# Patient Record
Sex: MALE | Race: BLACK OR AFRICAN AMERICAN | NOT HISPANIC OR LATINO | Employment: STUDENT | URBAN - METROPOLITAN AREA
[De-identification: names, ages, dates, MRNs, and addresses within clinical notes are randomized per-mention and may not be internally consistent; named-entity substitution may affect disease eponyms.]

---
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
## 2023-08-30 ENCOUNTER — OFFICE VISIT (OUTPATIENT)
Dept: URGENT CARE | Facility: CLINIC | Age: 18
End: 2023-08-30
Payer: MEDICAID

## 2023-08-30 VITALS
OXYGEN SATURATION: 97 % | SYSTOLIC BLOOD PRESSURE: 121 MMHG | BODY MASS INDEX: 19.73 KG/M2 | RESPIRATION RATE: 18 BRPM | HEIGHT: 69 IN | HEART RATE: 67 BPM | DIASTOLIC BLOOD PRESSURE: 72 MMHG | TEMPERATURE: 98 F | WEIGHT: 133.19 LBS

## 2023-08-30 DIAGNOSIS — Z20.2 POSSIBLE EXPOSURE TO STD: ICD-10-CM

## 2023-08-30 DIAGNOSIS — R30.0 DYSURIA: Primary | ICD-10-CM

## 2023-08-30 DIAGNOSIS — N34.2 URETHRITIS: ICD-10-CM

## 2023-08-30 PROCEDURE — 36415 COLL VENOUS BLD VENIPUNCTURE: CPT | Performed by: INTERNAL MEDICINE

## 2023-08-30 PROCEDURE — 96372 THER/PROPH/DIAG INJ SC/IM: CPT | Mod: S$GLB,,, | Performed by: INTERNAL MEDICINE

## 2023-08-30 PROCEDURE — 87491 CHLMYD TRACH DNA AMP PROBE: CPT | Performed by: INTERNAL MEDICINE

## 2023-08-30 PROCEDURE — 86803 HEPATITIS C AB TEST: CPT | Performed by: INTERNAL MEDICINE

## 2023-08-30 PROCEDURE — 96372 PR INJECTION,THERAP/PROPH/DIAG2ST, IM OR SUBCUT: ICD-10-PCS | Mod: S$GLB,,, | Performed by: INTERNAL MEDICINE

## 2023-08-30 PROCEDURE — 99214 PR OFFICE/OUTPT VISIT, EST, LEVL IV, 30-39 MIN: ICD-10-PCS | Mod: 25,S$GLB,, | Performed by: INTERNAL MEDICINE

## 2023-08-30 PROCEDURE — 87389 HIV-1 AG W/HIV-1&-2 AB AG IA: CPT | Performed by: INTERNAL MEDICINE

## 2023-08-30 PROCEDURE — 99214 OFFICE O/P EST MOD 30 MIN: CPT | Mod: 25,S$GLB,, | Performed by: INTERNAL MEDICINE

## 2023-08-30 PROCEDURE — 87661 TRICHOMONAS VAGINALIS AMPLIF: CPT | Performed by: INTERNAL MEDICINE

## 2023-08-30 PROCEDURE — 86592 SYPHILIS TEST NON-TREP QUAL: CPT | Performed by: INTERNAL MEDICINE

## 2023-08-30 RX ORDER — CETIRIZINE HYDROCHLORIDE 10 MG/1
10 TABLET ORAL DAILY PRN
COMMUNITY

## 2023-08-30 RX ORDER — DOXYCYCLINE 100 MG/1
100 CAPSULE ORAL 2 TIMES DAILY
COMMUNITY
Start: 2023-07-22 | End: 2023-08-30

## 2023-08-30 RX ORDER — DOXYCYCLINE HYCLATE 100 MG
100 TABLET ORAL EVERY 12 HOURS
Qty: 14 TABLET | Refills: 0 | Status: SHIPPED | OUTPATIENT
Start: 2023-08-30 | End: 2023-09-06

## 2023-08-30 RX ORDER — FLUTICASONE PROPIONATE 50 MCG
1 SPRAY, SUSPENSION (ML) NASAL
COMMUNITY

## 2023-08-30 RX ORDER — EPINEPHRINE 0.3 MG/.3ML
1 INJECTION SUBCUTANEOUS
COMMUNITY

## 2023-08-30 RX ORDER — CEFTRIAXONE 500 MG/1
500 INJECTION, POWDER, FOR SOLUTION INTRAMUSCULAR; INTRAVENOUS
Status: COMPLETED | OUTPATIENT
Start: 2023-08-30 | End: 2023-08-30

## 2023-08-30 RX ADMIN — CEFTRIAXONE 500 MG: 500 INJECTION, POWDER, FOR SOLUTION INTRAMUSCULAR; INTRAVENOUS at 02:08

## 2023-08-30 NOTE — PROGRESS NOTES
"Subjective:      Patient ID: Chuy Thomson is a 18 y.o. male.    Vitals:  height is 5' 9" (1.753 m) and weight is 60.4 kg (133 lb 2.5 oz). His temperature is 97.8 °F (36.6 °C). His blood pressure is 121/72 and his pulse is 67. His respiration is 18 and oxygen saturation is 97%.     Chief Complaint: Exposure to STD    Patient presents burning urination that started last week. Patient started taking antibiotics he had for a recent poss STD exposure.    Exposure to STD  The patient's pertinent negatives include no pelvic pain or penile discharge. This is a new problem. The current episode started in the past 7 days. The problem occurs constantly. The problem has been unchanged. The patient is experiencing no pain. He is sexually active. He never uses condoms. It is unknown whether or not his partner has an STD. His past medical history is significant for chlamydia.       Genitourinary:  Negative for penile discharge and pelvic pain.   Skin:  Negative for erythema.      Objective:     Physical Exam   Constitutional: He is oriented to person, place, and time.  Non-toxic appearance. He does not appear ill. No distress. normal  HENT:   Head: Normocephalic and atraumatic.   Ears:   Right Ear: External ear normal.   Left Ear: External ear normal.   Nose: Nose normal.   Mouth/Throat: Mucous membranes are moist.   Eyes: Conjunctivae are normal. Pupils are equal, round, and reactive to light. Right eye exhibits no discharge. Left eye exhibits no discharge. Extraocular movement intact   Pulmonary/Chest: Effort normal. No respiratory distress.   Abdominal: Normal appearance.   Neurological: He is alert and oriented to person, place, and time.   Skin: Skin is not diaphoretic and not pale. No bruising and No erythema jaundice  Psychiatric: His behavior is normal. Mood, judgment and thought content normal.       Assessment:     1. Dysuria    2. Urethritis    3. Possible exposure to STD        Plan:       Dysuria    Urethritis  -     " Cancel: HIV 1/2 Ag/Ab (4th Gen); Future; Expected date: 08/30/2023  -     Cancel: RPR; Future; Expected date: 08/30/2023  -     Cancel: HEPATITIS C ANTIBODY; Future; Expected date: 08/30/2023  -     C. trachomatis/N. gonorrhoeae by AMP DNA [facility]; Urine  -     cefTRIAXone injection 500 mg  -     doxycycline (VIBRA-TABS) 100 MG tablet; Take 1 tablet (100 mg total) by mouth every 12 (twelve) hours. for 7 days  Dispense: 14 tablet; Refill: 0  -     HEPATITIS C ANTIBODY  -     HIV 1/2 Ag/Ab (4th Gen)  -     RPR    Possible exposure to STD  -     Cancel: HIV 1/2 Ag/Ab (4th Gen); Future; Expected date: 08/30/2023  -     Cancel: RPR; Future; Expected date: 08/30/2023  -     Cancel: HEPATITIS C ANTIBODY; Future; Expected date: 08/30/2023  -     C. trachomatis/N. gonorrhoeae by AMP DNA [facility]; Urine  -     Trichomonas vaginalis, RNA, Qual, Urine  -     HEPATITIS C ANTIBODY  -     HIV 1/2 Ag/Ab (4th Gen)  -     RPR

## 2023-08-30 NOTE — PATIENT INSTRUCTIONS
Abstain from sexual activity for the next two weeks. Return to clinic with any issues. Start doxycycline 100 mg twice daily for 7 days. We will call with lab results.

## 2023-08-31 LAB
HCV AB SERPL QL IA: NORMAL
HIV 1+2 AB+HIV1 P24 AG SERPL QL IA: NORMAL

## 2023-09-01 LAB
C TRACH DNA SPEC QL NAA+PROBE: NOT DETECTED
N GONORRHOEA DNA SPEC QL NAA+PROBE: NOT DETECTED
RPR SER QL: NORMAL

## 2023-09-03 LAB
SPECIMEN SOURCE: NORMAL
T VAGINALIS RRNA SPEC QL NAA+PROBE: NEGATIVE

## 2023-09-06 ENCOUNTER — TELEPHONE (OUTPATIENT)
Dept: URGENT CARE | Facility: CLINIC | Age: 18
End: 2023-09-06
Payer: MEDICAID

## 2023-12-13 ENCOUNTER — OFFICE VISIT (OUTPATIENT)
Dept: URGENT CARE | Facility: CLINIC | Age: 18
End: 2023-12-13
Payer: MEDICAID

## 2023-12-13 VITALS
DIASTOLIC BLOOD PRESSURE: 69 MMHG | OXYGEN SATURATION: 99 % | HEIGHT: 69 IN | RESPIRATION RATE: 19 BRPM | SYSTOLIC BLOOD PRESSURE: 118 MMHG | WEIGHT: 134.69 LBS | BODY MASS INDEX: 19.95 KG/M2 | TEMPERATURE: 98 F | HEART RATE: 67 BPM

## 2023-12-13 DIAGNOSIS — J10.1 INFLUENZA A: ICD-10-CM

## 2023-12-13 DIAGNOSIS — R05.9 COUGH, UNSPECIFIED TYPE: Primary | ICD-10-CM

## 2023-12-13 LAB
CTP QC/QA: YES
CTP QC/QA: YES
POC MOLECULAR INFLUENZA A AGN: POSITIVE
POC MOLECULAR INFLUENZA B AGN: NEGATIVE
SARS-COV-2 AG RESP QL IA.RAPID: NEGATIVE

## 2023-12-13 PROCEDURE — 87811 SARS-COV-2 COVID19 W/OPTIC: CPT | Mod: QW,S$GLB,, | Performed by: INTERNAL MEDICINE

## 2023-12-13 PROCEDURE — 99214 PR OFFICE/OUTPT VISIT, EST, LEVL IV, 30-39 MIN: ICD-10-PCS | Mod: S$GLB,,, | Performed by: INTERNAL MEDICINE

## 2023-12-13 PROCEDURE — 99214 OFFICE O/P EST MOD 30 MIN: CPT | Mod: S$GLB,,, | Performed by: INTERNAL MEDICINE

## 2023-12-13 PROCEDURE — 87811 SARS CORONAVIRUS 2 ANTIGEN POCT, MANUAL READ: ICD-10-PCS | Mod: QW,S$GLB,, | Performed by: INTERNAL MEDICINE

## 2023-12-13 PROCEDURE — 87502 POCT INFLUENZA A/B MOLECULAR: ICD-10-PCS | Mod: QW,S$GLB,, | Performed by: INTERNAL MEDICINE

## 2023-12-13 PROCEDURE — 87502 INFLUENZA DNA AMP PROBE: CPT | Mod: QW,S$GLB,, | Performed by: INTERNAL MEDICINE

## 2023-12-13 RX ORDER — ALBUTEROL SULFATE 90 UG/1
2 AEROSOL, METERED RESPIRATORY (INHALATION) EVERY 6 HOURS PRN
Qty: 18 G | Refills: 0 | Status: SHIPPED | OUTPATIENT
Start: 2023-12-13 | End: 2024-12-12

## 2023-12-13 RX ORDER — BENZONATATE 100 MG/1
100 CAPSULE ORAL 3 TIMES DAILY PRN
Qty: 30 CAPSULE | Refills: 0 | Status: SHIPPED | OUTPATIENT
Start: 2023-12-13 | End: 2023-12-23

## 2023-12-13 NOTE — PROGRESS NOTES
"Subjective:      Patient ID: Chuy Thomson is a 18 y.o. male.    Vitals:  height is 5' 9" (1.753 m) and weight is 61.1 kg (134 lb 11.2 oz). His oral temperature is 97.8 °F (36.6 °C). His blood pressure is 118/69 and his pulse is 67. His respiration is 19 and oxygen saturation is 99%.     Chief Complaint: Cough    Patient presents a cough, sore throat and runny nose that started Saturday. Pt has been taking severe cold and flu otc medication, its helping a little.    Cough  This is a new problem. The current episode started in the past 7 days. The problem has been unchanged. The problem occurs every few minutes. Associated symptoms include nasal congestion, postnasal drip, rhinorrhea and a sore throat. Pertinent negatives include no fever. He has tried OTC cough suppressant for the symptoms. The treatment provided mild relief.       Constitution: Negative for fever.   HENT:  Positive for postnasal drip and sore throat.    Respiratory:  Positive for cough.    Skin:  Negative for erythema.      Objective:     Physical Exam   Constitutional: He is oriented to person, place, and time. He appears well-developed. No distress.   HENT:   Head: Normocephalic and atraumatic.   Ears:   Right Ear: External ear normal.   Left Ear: External ear normal.   Nose: Nose normal.   Mouth/Throat: Oropharynx is clear and moist. No oropharyngeal exudate.   Eyes: Conjunctivae and EOM are normal. Pupils are equal, round, and reactive to light. Right eye exhibits no discharge. Left eye exhibits no discharge. No scleral icterus.   Neck: Neck supple.   Cardiovascular: Normal rate, regular rhythm and normal heart sounds.   No murmur heard.Exam reveals no gallop and no friction rub.   Pulmonary/Chest: Effort normal. No respiratory distress. He has no wheezes. He has no rales.   Abdominal: There is no abdominal tenderness.   Lymphadenopathy:     He has no cervical adenopathy.   Neurological: He is alert and oriented to person, place, and time. "   Skin: Skin is warm, dry, not diaphoretic and no rash. Capillary refill takes less than 2 seconds. No erythema   Psychiatric: His behavior is normal.   Nursing note and vitals reviewed.      Assessment:     1. Cough, unspecified type    2. Influenza A        Plan:       Cough, unspecified type  -     SARS Coronavirus 2 Antigen, POCT Manual Read  -     POCT Influenza A/B MOLECULAR    Influenza A  -     benzonatate (TESSALON) 100 MG capsule; Take 1 capsule (100 mg total) by mouth 3 (three) times daily as needed for Cough.  Dispense: 30 capsule; Refill: 0  -     albuterol (VENTOLIN HFA) 90 mcg/actuation inhaler; Inhale 2 puffs into the lungs every 6 (six) hours as needed for Wheezing. Rescue  Dispense: 18 g; Refill: 0

## 2024-03-05 ENCOUNTER — OFFICE VISIT (OUTPATIENT)
Dept: URGENT CARE | Facility: CLINIC | Age: 19
End: 2024-03-05
Payer: MEDICAID

## 2024-03-05 VITALS
BODY MASS INDEX: 20.54 KG/M2 | HEART RATE: 64 BPM | HEIGHT: 69 IN | OXYGEN SATURATION: 99 % | SYSTOLIC BLOOD PRESSURE: 121 MMHG | TEMPERATURE: 97 F | RESPIRATION RATE: 19 BRPM | WEIGHT: 138.69 LBS | DIASTOLIC BLOOD PRESSURE: 66 MMHG

## 2024-03-05 DIAGNOSIS — Z72.51 UNPROTECTED SEXUAL INTERCOURSE: ICD-10-CM

## 2024-03-05 DIAGNOSIS — R30.0 DYSURIA: ICD-10-CM

## 2024-03-05 DIAGNOSIS — Z11.3 SCREENING FOR STD (SEXUALLY TRANSMITTED DISEASE): Primary | ICD-10-CM

## 2024-03-05 PROCEDURE — 87661 TRICHOMONAS VAGINALIS AMPLIF: CPT | Performed by: NURSE PRACTITIONER

## 2024-03-05 PROCEDURE — 87491 CHLMYD TRACH DNA AMP PROBE: CPT | Performed by: NURSE PRACTITIONER

## 2024-03-05 PROCEDURE — 99214 OFFICE O/P EST MOD 30 MIN: CPT | Mod: S$GLB,,, | Performed by: NURSE PRACTITIONER

## 2024-03-05 PROCEDURE — 87086 URINE CULTURE/COLONY COUNT: CPT | Performed by: NURSE PRACTITIONER

## 2024-03-05 RX ORDER — DOXYCYCLINE 100 MG/1
100 CAPSULE ORAL 2 TIMES DAILY
Qty: 14 CAPSULE | Refills: 0 | Status: SHIPPED | OUTPATIENT
Start: 2024-03-05 | End: 2024-03-12

## 2024-03-05 RX ORDER — CEFTRIAXONE 500 MG/1
500 INJECTION, POWDER, FOR SOLUTION INTRAMUSCULAR; INTRAVENOUS
Status: COMPLETED | OUTPATIENT
Start: 2024-03-05 | End: 2024-03-05

## 2024-03-05 RX ORDER — WATER FOR INJECTION,STERILE
2.1 VIAL (ML) INJECTION
Status: DISCONTINUED | OUTPATIENT
Start: 2024-03-05 | End: 2024-03-07

## 2024-03-05 RX ADMIN — Medication 1 ML: at 01:03

## 2024-03-05 RX ADMIN — CEFTRIAXONE 500 MG: 500 INJECTION, POWDER, FOR SOLUTION INTRAMUSCULAR; INTRAVENOUS at 01:03

## 2024-03-05 NOTE — PATIENT INSTRUCTIONS
Start Doxycycline today.  Take it with food and avoid direct sunlight.  We will call you with your test results in 3-5 days.  Strict condom use advised. Return to clinic as needed.     STD Screening     You were tested for sexual transmitted diseases today, we will call you in 3-7 days with the results of the testing. If you need more medication when the labs result come in, we will call you and phone them in for you.  PLEASE SET UP YOUR [employer] ACCOUNT SO THAT YOU CAN RECEIVE YOUR LAB RESULTS ONCE THEY RETURN.  Strict condom use advised.  Please remain abstinent until further notice.         IF POSITIVE:    Notify sexual partners of the need for testing.    NO sexual intercourse until given all lab results and appropriate treatment. Avoid sexual intercourse for 7 days until you and your partner have been treated.     Complete ALL medications prescribed (if required).  Please supplement with OTC probiotics and yogurt if prescribed antibiotics. Take probiotics or eat yogurt 4 hours after you take antibiotics.    Re-test to ensure infection has cleared-there are infections that require more agressive treatment.  Retest for all in 6 weeks (if still have symptoms) and again in 3-6 months to ensure true negative results.  You may be instructed to re-test for cure sooner than 6 weeks.     Today's testing will give no crediable information if you have unprotected sexual activities going forward.      Today's testing will give no crediable information if you have unprotected sexual activities going forward.      REMEMBER WEAR CONDOMS AND GET TESTED OFTEN.             Discussed diagnosis with patient as well as treatment and home care. Discussed return to clinic precautions vs ER precautions. All patients questions answered. Patient verbalized understanding. Patient agreed with plan of care.     You must understand that you have received an Urgent Care treatment only and that you may be released before all of your medical  problems are known or treated.

## 2024-03-05 NOTE — PROGRESS NOTES
"Subjective:      Patient ID: Chuy Thomson is a 18 y.o. male.    Vitals:  height is 5' 9" (1.753 m) and weight is 62.9 kg (138 lb 10.7 oz). His oral temperature is 96.9 °F (36.1 °C). His blood pressure is 121/66 and his pulse is 64. His respiration is 19 and oxygen saturation is 99%.     Chief Complaint: Dysuria    Pt presents burning during urination that has been on going for 4 days. Would like urine std testing.    19 yo man, c/o dysuria for the past 4 days. Reports UPI 2 weeks ago with a female partner.  Reports multiple partners in the last 3 months with intermittent condom use.  Last STD testing was in 8/23, all tests are negative. He has a history of CT, which was treated a few years ago. He denies fevers, chills, urinary frequency, urgency, abdominal pain, flank pain or pain or swelling in the scrotum or penis. No n/v/d.      Dysuria   This is a new problem. The current episode started in the past 7 days. The problem occurs every urination. The problem has been unchanged. The quality of the pain is described as burning. The pain is at a severity of 0/10. The patient is experiencing no pain. There has been no fever. He is Sexually active. He has tried nothing for the symptoms. His past medical history is significant for STD.       Genitourinary:  Positive for dysuria.      Objective:     Physical Exam   Constitutional: He is oriented to person, place, and time. He appears well-developed. No distress.   HENT:   Head: Normocephalic and atraumatic.   Ears:   Right Ear: External ear normal.   Left Ear: External ear normal.   Nose: Nose normal. No nasal deformity. No epistaxis.   Mouth/Throat: Oropharynx is clear and moist and mucous membranes are normal.   Eyes: Conjunctivae and lids are normal.   Neck: Trachea normal and phonation normal. Neck supple.   Cardiovascular: Normal rate, regular rhythm and normal heart sounds.   Pulmonary/Chest: Effort normal and breath sounds normal.   Abdominal: Normal appearance and " bowel sounds are normal. Soft. There is no left CVA tenderness and no right CVA tenderness.   Genitourinary:         Comments: Patient declined a  exam today.     Musculoskeletal: Normal range of motion.         General: Normal range of motion.   Neurological: He is alert and oriented to person, place, and time. He has normal reflexes.   Skin: Skin is warm, dry, intact and not diaphoretic.   Psychiatric: His speech is normal and behavior is normal. Judgment and thought content normal.   Nursing note and vitals reviewed.      Assessment:     1. Screening for STD (sexually transmitted disease)    2. Dysuria    3. Unprotected sexual intercourse        Plan:   Follow labs, will contact the patient with test results in 3-5 days.  Strongly advised strict condom use.  Return to clinic as needed.     Screening for STD (sexually transmitted disease)  -     C. trachomatis/N. gonorrhoeae by AMP DNA [facility]; Urine  -     Trichomonas vaginalis, RNA, Qual, Urine    Dysuria  -     C. trachomatis/N. gonorrhoeae by AMP DNA [facility]; Urine  -     Trichomonas vaginalis, RNA, Qual, Urine  -     cefTRIAXone injection 500 mg  -     doxycycline (VIBRAMYCIN) 100 MG Cap; Take 1 capsule (100 mg total) by mouth 2 (two) times daily. for 7 days  Dispense: 14 capsule; Refill: 0  -     CULTURE, URINE    Unprotected sexual intercourse  -     C. trachomatis/N. gonorrhoeae by AMP DNA [facility]; Urine  -     Trichomonas vaginalis, RNA, Qual, Urine  -     cefTRIAXone injection 500 mg  -     doxycycline (VIBRAMYCIN) 100 MG Cap; Take 1 capsule (100 mg total) by mouth 2 (two) times daily. for 7 days  Dispense: 14 capsule; Refill: 0

## 2024-03-06 LAB
BACTERIA UR CULT: NO GROWTH
C TRACH DNA SPEC QL NAA+PROBE: NOT DETECTED
N GONORRHOEA DNA SPEC QL NAA+PROBE: NOT DETECTED

## 2024-03-07 LAB
SPECIMEN SOURCE: NORMAL
T VAGINALIS RRNA SPEC QL NAA+PROBE: NEGATIVE

## 2024-03-07 RX ORDER — WATER FOR INJECTION,STERILE
1 VIAL (ML) INJECTION
Status: COMPLETED | OUTPATIENT
Start: 2024-03-05 | End: 2024-03-05

## 2024-03-07 RX ORDER — WATER FOR INJECTION,STERILE
1 VIAL (ML) INJECTION
Status: DISCONTINUED | OUTPATIENT
Start: 2024-03-05 | End: 2024-03-07

## 2024-03-08 ENCOUNTER — TELEPHONE (OUTPATIENT)
Dept: URGENT CARE | Facility: CLINIC | Age: 19
End: 2024-03-08
Payer: COMMERCIAL

## 2024-03-08 NOTE — TELEPHONE ENCOUNTER
Called patient regarding lab results.  Left message on voice mail.  GC/CT and Trichomonas are negative.  Urine culture showed no growth.  his is the first attempt to reach the patient.

## 2024-03-12 ENCOUNTER — PATIENT MESSAGE (OUTPATIENT)
Dept: URGENT CARE | Facility: CLINIC | Age: 19
End: 2024-03-12
Payer: COMMERCIAL

## 2024-05-09 ENCOUNTER — OFFICE VISIT (OUTPATIENT)
Dept: URGENT CARE | Facility: CLINIC | Age: 19
End: 2024-05-09
Payer: COMMERCIAL

## 2024-05-09 VITALS
RESPIRATION RATE: 19 BRPM | SYSTOLIC BLOOD PRESSURE: 105 MMHG | DIASTOLIC BLOOD PRESSURE: 61 MMHG | HEART RATE: 55 BPM | OXYGEN SATURATION: 99 % | WEIGHT: 138 LBS | TEMPERATURE: 97 F | BODY MASS INDEX: 20.44 KG/M2 | HEIGHT: 69 IN

## 2024-05-09 DIAGNOSIS — Z11.3 SCREENING FOR STD (SEXUALLY TRANSMITTED DISEASE): ICD-10-CM

## 2024-05-09 DIAGNOSIS — R36.9 URETHRAL DISCHARGE: Primary | ICD-10-CM

## 2024-05-09 LAB
HCV AB SERPL QL IA: NORMAL
HIV 1+2 AB+HIV1 P24 AG SERPL QL IA: NORMAL

## 2024-05-09 PROCEDURE — 36415 COLL VENOUS BLD VENIPUNCTURE: CPT | Performed by: NURSE PRACTITIONER

## 2024-05-09 PROCEDURE — 99213 OFFICE O/P EST LOW 20 MIN: CPT | Mod: S$GLB,,, | Performed by: NURSE PRACTITIONER

## 2024-05-09 PROCEDURE — 87389 HIV-1 AG W/HIV-1&-2 AB AG IA: CPT | Performed by: NURSE PRACTITIONER

## 2024-05-09 PROCEDURE — 87661 TRICHOMONAS VAGINALIS AMPLIF: CPT | Performed by: NURSE PRACTITIONER

## 2024-05-09 PROCEDURE — 86593 SYPHILIS TEST NON-TREP QUANT: CPT | Performed by: NURSE PRACTITIONER

## 2024-05-09 PROCEDURE — 87491 CHLMYD TRACH DNA AMP PROBE: CPT | Performed by: NURSE PRACTITIONER

## 2024-05-09 PROCEDURE — 86803 HEPATITIS C AB TEST: CPT | Performed by: NURSE PRACTITIONER

## 2024-05-09 PROCEDURE — 87591 N.GONORRHOEAE DNA AMP PROB: CPT | Performed by: NURSE PRACTITIONER

## 2024-05-09 NOTE — PATIENT INSTRUCTIONS
We will call you with your test results in 3-5 days.  Follow up in urgent care for treatment if you have any positive test results in your portal.  Very strongly encourage condom use.  Return to clinica as needed.     STD Screening     IF YOU ARE TESTING TODAY WITH A CONCERN FOR A POTENTIAL EXPOSURE TO AN STD AFTER UNPROTECTED SEXUAL INTERCOURSE, IT IS RECOMMENDED TO TEST TODAY AND AGAIN IN 4-6 WEEKS AFTER THE POTENTIAL EXPOSURE TO ENSURE TRUE NEGATIVE RESULTS.       REMAIN ABSTINENT UNTIL AFTER YOUR TESTS HAVE RESULTED    We will call you in 3-7 days with the results of the testing. If you need more medication when the labs result come in, we will call you and phone them in for you.  PLEASE SET UP YOUR [employer] ACCOUNT SO THAT YOU CAN RECEIVE YOUR LAB RESULTS ONCE THEY RETURN.        IF POSITIVE:     IF YOU ARE POSITIVE FOR AN STD, IT IS RECOMMENDED THAT YOU TEST AGAIN IN 3-6 MONTHS AFTER COMPLETION OF THERAPY TO ENSURE YOU REMAIN NEGATIVE.  CERTAIN POSITIVE TESTS MAY REQUIRE SOONER RE-TESTING AND WE WILL GUIDE YOU IN THESE SITUATIONS.  Notify sexual partners of the need for testing.    NO sexual intercourse until given all lab results and appropriate treatment. Avoid sexual intercourse for 7 days until you and your partner have been treated.  Complete ALL medications prescribed (if required).  Please supplement with OTC probiotics and yogurt if prescribed antibiotics. Take probiotics or eat yogurt 4 hours after you take antibiotics.     Today's testing will give no crediable information if you have unprotected sexual activities going forward.         REMEMBER WEAR CONDOMS AND GET TESTED OFTEN.

## 2024-05-09 NOTE — PROGRESS NOTES
"Subjective:      Patient ID: Chuy Thomson is a 18 y.o. male.    Vitals:  height is 5' 9" (1.753 m) and weight is 62.6 kg (138 lb). His oral temperature is 97.1 °F (36.2 °C). His blood pressure is 105/61 and his pulse is 55 (abnormal). His respiration is 19 and oxygen saturation is 99%.     Chief Complaint: Penile Discharge    Pt presents clear penile discharge that has been on going for 3 days.    17 yo man, reports for STD testing after having one episode of clear urethral discharge 3 days ago.  He sneezed after he finished urinating and noticed the discharge.  Pt is not sure if this was urine or not. He reports UPI with 3 female partners since he was last tested for STD's in 3/24.  All tests were negative in March. He also had negative STD results in 8/30/23 and 12/28/21.  He denies any fevers, chills, n/v/d, abdominal pain or flank pain.  Denies any urinary urgency, frequency, dysuria, swelling in the scrotum or testicles or pelvic pain.         Penile Discharge  The patient's primary symptoms include penile discharge. This is a new problem. The current episode started in the past 7 days. The problem occurs constantly. The problem has been unchanged. The patient's penis is circumcised.The penile discharge was Clear. He has tried nothing for the symptoms. He is sexually active. He inconsistently uses condoms. No, his partner does not have an STD. His past medical history is significant for chlamydia.       Genitourinary:  Positive for penile discharge.      Objective:     Physical Exam   Constitutional: He is oriented to person, place, and time. He appears well-developed. No distress.   HENT:   Head: Normocephalic and atraumatic.   Ears:   Right Ear: External ear normal.   Left Ear: External ear normal.   Nose: Nose normal. No nasal deformity. No epistaxis.   Mouth/Throat: Oropharynx is clear and moist and mucous membranes are normal.   Eyes: Conjunctivae and lids are normal.   Neck: Trachea normal and phonation " normal. Neck supple.   Cardiovascular: Normal rate, regular rhythm and normal heart sounds.   Pulmonary/Chest: Effort normal and breath sounds normal.   Abdominal: Normal appearance and bowel sounds are normal. He exhibits no distension. Soft. There is no abdominal tenderness. Hernia confirmed negative in the left inguinal area and confirmed negative in the right inguinal area.   Genitourinary:    Testes normal.   no penile rash. Right testis shows no mass, no swelling and no tenderness. Left testis shows no mass, no swelling and no tenderness. Right epididymis is/has Normal. Left epididymis is/has normal. Circumcised. No phimosis, paraphimosis, hypospadias, penile erythema, penile tenderness or penile swelling. Penis exhibits no lesions.         Comments: No urethral discharge noted on exam or any inguinal adenopathy     Musculoskeletal: Normal range of motion.         General: Normal range of motion.   Lymphadenopathy: No inguinal adenopathy noted on the right or left side.   Neurological: He is alert and oriented to person, place, and time. He has normal reflexes.   Skin: Skin is warm, dry, intact and not diaphoretic.   Psychiatric: His speech is normal and behavior is normal. Judgment and thought content normal.   Nursing note and vitals reviewed.      Assessment:     1. Urethral discharge    2. Screening for STD (sexually transmitted disease)        Plan:   Patient is currently asymptomatic with a normal exam.  Will not presumptively treat today.  Follow labs and notify patient.  He knows he can follow up in any urgent care over the weekend for treatment if necessary.  He can view his test results in the protal.  Return to clinic as needed.     Urethral discharge  -     C. trachomatis/N. gonorrhoeae by AMP DNA [facility]; Urine  -     Trichomonas vaginalis, RNA, Qual, Urine    Screening for STD (sexually transmitted disease)  -     C. trachomatis/N. gonorrhoeae by AMP DNA [facility]; Urine  -     Hepatitis C  Antibody  -     HIV 1/2 Ag/Ab (4th Gen)  -     Trichomonas vaginalis, RNA, Qual, Urine  -     Misc Sendout Test, Blood treponema pallidum

## 2024-05-10 LAB — TREPONEMA PALLIDUM IGG+IGM AB [PRESENCE] IN SERUM OR PLASMA BY IMMUNOASSAY: NONREACTIVE

## 2024-05-11 LAB
C TRACH DNA SPEC QL NAA+PROBE: NOT DETECTED
N GONORRHOEA DNA SPEC QL NAA+PROBE: NOT DETECTED
SPECIMEN SOURCE: NORMAL
T VAGINALIS RRNA SPEC QL NAA+PROBE: NEGATIVE

## 2024-10-02 ENCOUNTER — OFFICE VISIT (OUTPATIENT)
Dept: URGENT CARE | Facility: CLINIC | Age: 19
End: 2024-10-02
Payer: COMMERCIAL

## 2024-10-02 VITALS
BODY MASS INDEX: 18.96 KG/M2 | DIASTOLIC BLOOD PRESSURE: 63 MMHG | TEMPERATURE: 99 F | HEART RATE: 62 BPM | HEIGHT: 69 IN | RESPIRATION RATE: 18 BRPM | WEIGHT: 128 LBS | SYSTOLIC BLOOD PRESSURE: 118 MMHG | OXYGEN SATURATION: 98 %

## 2024-10-02 DIAGNOSIS — Z11.3 SCREENING EXAMINATION FOR STD (SEXUALLY TRANSMITTED DISEASE): Primary | ICD-10-CM

## 2024-10-02 PROBLEM — Z87.19 HISTORY OF ESOPHAGEAL REFLUX: Status: ACTIVE | Noted: 2019-12-11

## 2024-10-02 PROBLEM — R13.10 DYSPHAGIA: Status: ACTIVE | Noted: 2019-12-11

## 2024-10-02 PROCEDURE — 87491 CHLMYD TRACH DNA AMP PROBE: CPT | Performed by: INTERNAL MEDICINE

## 2024-10-02 PROCEDURE — 99213 OFFICE O/P EST LOW 20 MIN: CPT | Mod: S$GLB,,, | Performed by: INTERNAL MEDICINE

## 2024-10-02 PROCEDURE — 87591 N.GONORRHOEAE DNA AMP PROB: CPT | Performed by: INTERNAL MEDICINE

## 2024-10-02 NOTE — PROGRESS NOTES
"Subjective:      Patient ID: Chuy Thomson is a 19 y.o. male.    Vitals:  height is 5' 9" (1.753 m) and weight is 58 kg (127 lb 15.6 oz). His oral temperature is 98.8 °F (37.1 °C). His blood pressure is 118/63 and his pulse is 62. His respiration is 18 and oxygen saturation is 98%.     Chief Complaint: Exposure to STD    This is a 19 y.o. male who presents today with a chief complaint of std routine testing. Last intercourse was 3 days ago. Would like urine std testing only.    Exposure to STD  This is a new problem. The current episode started today. He is sexually active. He inconsistently uses condoms. No, his partner does not have an STD. His past medical history is significant for chlamydia.       Skin:  Negative for erythema.      Objective:     Physical Exam   Constitutional: He is oriented to person, place, and time.  Non-toxic appearance. He does not appear ill. No distress. normal  HENT:   Head: Normocephalic and atraumatic.   Ears:   Right Ear: External ear normal.   Left Ear: External ear normal.   Nose: Nose normal.   Mouth/Throat: Mucous membranes are moist.   Eyes: Conjunctivae are normal. Pupils are equal, round, and reactive to light. Right eye exhibits no discharge. Left eye exhibits no discharge. Extraocular movement intact   Pulmonary/Chest: Effort normal. No respiratory distress.   Abdominal: Normal appearance.   Neurological: He is alert and oriented to person, place, and time.   Skin: Skin is not diaphoretic and not pale. No bruising and No erythema jaundice  Psychiatric: His behavior is normal. Mood, judgment and thought content normal.       Assessment:     1. Screening examination for STD (sexually transmitted disease)        Plan:       Screening examination for STD (sexually transmitted disease)  -     C. trachomatis/N. gonorrhoeae by AMP [facility]; Urine                    "

## 2024-10-02 NOTE — PATIENT INSTRUCTIONS
IF YOU ARE TESTING TODAY WITH A CONCERN FOR A POTENTIAL EXPOSURE TO AN STD AFTER UNPROTECTED SEXUAL INTERCOURSE, IT IS RECOMMENDED TO TEST TODAY AND AGAIN IN 4-6 WEEKS AFTER THE POTENTIAL EXPOSURE TO ENSURE TRUE NEGATIVE RESULTS.      REMAIN ABSTINENT UNTIL AFTER YOUR TESTS HAVE RESULTED    We will call you in 3-7 days with the results of the testing. If you need more medication when the labs result come in, we will call you and phone them in for you.  PLEASE SET UP YOUR [employer] ACCOUNT SO THAT YOU CAN RECEIVE YOUR LAB RESULTS ONCE THEY RETURN.        IF POSITIVE for an STD:     IF YOU ARE POSITIVE FOR AN STD, IT IS RECOMMENDED THAT YOU TEST AGAIN IN 3-6 MONTHS AFTER COMPLETION OF THERAPY TO ENSURE YOU REMAIN NEGATIVE.    CERTAIN POSITIVE TESTS MAY REQUIRE SOONER RE-TESTING AND WE WILL GUIDE YOU IN THESE SITUATIONS.    Notify sexual partners of the need for testing.      NO sexual intercourse until given all lab results and appropriate treatment. Avoid sexual intercourse for 7 days until you and your partner have been treated.    Complete ALL medications prescribed (if required).  Please supplement with OTC probiotics and yogurt if prescribed antibiotics. Take probiotics or eat yogurt 4 hours after you take antibiotics.           Today's testing will give no crediable information if you have unprotected sexual activities going forward.         REMEMBER WEAR CONDOMS AND GET TESTED OFTEN.

## 2024-10-05 LAB
C TRACH DNA SPEC QL NAA+PROBE: NOT DETECTED
N GONORRHOEA DNA SPEC QL NAA+PROBE: NOT DETECTED

## 2024-10-07 ENCOUNTER — PATIENT MESSAGE (OUTPATIENT)
Dept: URGENT CARE | Facility: CLINIC | Age: 19
End: 2024-10-07
Payer: COMMERCIAL

## 2024-11-11 ENCOUNTER — OFFICE VISIT (OUTPATIENT)
Dept: URGENT CARE | Facility: CLINIC | Age: 19
End: 2024-11-11
Payer: COMMERCIAL

## 2024-11-11 VITALS
TEMPERATURE: 98 F | HEIGHT: 69 IN | HEART RATE: 55 BPM | WEIGHT: 134.69 LBS | DIASTOLIC BLOOD PRESSURE: 64 MMHG | SYSTOLIC BLOOD PRESSURE: 107 MMHG | OXYGEN SATURATION: 99 % | BODY MASS INDEX: 19.95 KG/M2 | RESPIRATION RATE: 17 BRPM

## 2024-11-11 DIAGNOSIS — R36.9 PENILE DISCHARGE: Primary | ICD-10-CM

## 2024-11-11 DIAGNOSIS — Z11.3 SCREENING EXAMINATION FOR VENEREAL DISEASE: ICD-10-CM

## 2024-11-11 DIAGNOSIS — R30.0 DYSURIA: ICD-10-CM

## 2024-11-11 LAB
HCV AB SERPL QL IA: NORMAL
HIV 1+2 AB+HIV1 P24 AG SERPL QL IA: NORMAL
TREPONEMA PALLIDUM IGG+IGM AB [PRESENCE] IN SERUM OR PLASMA BY IMMUNOASSAY: NONREACTIVE

## 2024-11-11 PROCEDURE — 36415 COLL VENOUS BLD VENIPUNCTURE: CPT | Performed by: NURSE PRACTITIONER

## 2024-11-11 PROCEDURE — 99213 OFFICE O/P EST LOW 20 MIN: CPT | Mod: S$GLB,,, | Performed by: NURSE PRACTITIONER

## 2024-11-11 PROCEDURE — 86593 SYPHILIS TEST NON-TREP QUANT: CPT | Performed by: NURSE PRACTITIONER

## 2024-11-11 PROCEDURE — 87389 HIV-1 AG W/HIV-1&-2 AB AG IA: CPT | Performed by: NURSE PRACTITIONER

## 2024-11-11 PROCEDURE — 86803 HEPATITIS C AB TEST: CPT | Performed by: NURSE PRACTITIONER

## 2024-11-11 PROCEDURE — 87491 CHLMYD TRACH DNA AMP PROBE: CPT | Performed by: NURSE PRACTITIONER

## 2024-11-11 NOTE — PROGRESS NOTES
"Subjective:      Patient ID: Chuy Thomson is a 19 y.o. male.    Vitals:  height is 5' 9" (1.753 m) and weight is 61.1 kg (134 lb 11.2 oz). His oral temperature is 98 °F (36.7 °C). His blood pressure is 107/64 and his pulse is 55 (abnormal). His respiration is 17 and oxygen saturation is 99%.     Chief Complaint: Exposure to STD    20 yo male pt c/o exposure to STD, last week. Pt states he noticed am clear discharge at the beginning of last week.     Exposure to STD  The patient's primary symptoms include genital itching and penile discharge. This is a new problem. The current episode started in the past 7 days. The problem has been unchanged. The patient is experiencing no pain. Pertinent negatives include no rash. Nothing aggravates the symptoms. He has tried nothing for the symptoms. He is sexually active. He inconsistently uses condoms. It is unknown whether or not his partner has an STD.       Genitourinary:  Positive for penile discharge.   Skin:  Negative for rash.    Pt states he has multiple unprotected sexual partners since August 2024.   Objective:     Physical Exam  No physical exam was performed today.   Assessment:     1. Penile discharge    2. Screening examination for venereal disease    3. Dysuria        Plan:       Penile discharge  -     C. trachomatis/N. gonorrhoeae by AMP [facility]; Urine  -     Hepatitis C Antibody  -     HIV 1/2 Ag/Ab (4th Gen)  -     Treponema Pallidium Antibodies IgG, IgM    Screening examination for venereal disease  -     C. trachomatis/N. gonorrhoeae by AMP [facility]; Urine  -     Hepatitis C Antibody  -     HIV 1/2 Ag/Ab (4th Gen)  -     Treponema Pallidium Antibodies IgG, IgM    Dysuria  -     C. trachomatis/N. gonorrhoeae by AMP [facility]; Urine  -     Hepatitis C Antibody  -     HIV 1/2 Ag/Ab (4th Gen)  -     Treponema Pallidium Antibodies IgG, IgM      Wants to wait for results for treatment.              "

## 2024-11-11 NOTE — PATIENT INSTRUCTIONS
STD Screening     IF YOU ARE TESTING TODAY WITH A CONCERN FOR A POTENTIAL EXPOSURE TO AN STD AFTER UNPROTECTED SEXUAL INTERCOURSE, IT IS RECOMMENDED TO TEST TODAY AND AGAIN IN 4-6 WEEKS AFTER THE POTENTIAL EXPOSURE TO ENSURE TRUE NEGATIVE RESULTS.       REMAIN ABSTINENT UNTIL AFTER YOUR TESTS HAVE RESULTED    We will call you in 3-7 days with the results of the testing. If you need more medication when the labs result come in, we will call you and phone them in for you.  PLEASE SET UP YOUR [email or web address] ACCOUNT SO THAT YOU CAN RECEIVE YOUR LAB RESULTS ONCE THEY RETURN.        IF POSITIVE:     IF YOU ARE POSITIVE FOR AN STD, IT IS RECOMMENDED THAT YOU TEST AGAIN IN 3-6 MONTHS AFTER COMPLETION OF THERAPY TO ENSURE YOU REMAIN NEGATIVE.  CERTAIN POSITIVE TESTS MAY REQUIRE SOONER RE-TESTING AND WE WILL GUIDE YOU IN THESE SITUATIONS.  Notify sexual partners of the need for testing.    NO sexual intercourse until given all lab results and appropriate treatment. Avoid sexual intercourse for 7 days until you and your partner have been treated.  Complete ALL medications prescribed (if required).  Please supplement with OTC probiotics and yogurt if prescribed antibiotics. Take probiotics or eat yogurt 4 hours after you take antibiotics.     Today's testing will give no crediable information if you have unprotected sexual activities going forward.         REMEMBER WEAR CONDOMS AND GET TESTED OFTEN.

## 2024-11-12 LAB
C TRACH DNA SPEC QL NAA+PROBE: NOT DETECTED
N GONORRHOEA DNA SPEC QL NAA+PROBE: NOT DETECTED

## 2024-11-13 ENCOUNTER — PATIENT MESSAGE (OUTPATIENT)
Dept: URGENT CARE | Facility: CLINIC | Age: 19
End: 2024-11-13
Payer: COMMERCIAL

## 2024-11-25 ENCOUNTER — OFFICE VISIT (OUTPATIENT)
Dept: URGENT CARE | Facility: CLINIC | Age: 19
End: 2024-11-25
Payer: COMMERCIAL

## 2024-11-25 VITALS
OXYGEN SATURATION: 98 % | DIASTOLIC BLOOD PRESSURE: 68 MMHG | HEART RATE: 67 BPM | HEIGHT: 69 IN | BODY MASS INDEX: 19.67 KG/M2 | SYSTOLIC BLOOD PRESSURE: 113 MMHG | RESPIRATION RATE: 19 BRPM | TEMPERATURE: 97 F | WEIGHT: 132.81 LBS

## 2024-11-25 DIAGNOSIS — R30.0 DYSURIA: ICD-10-CM

## 2024-11-25 DIAGNOSIS — R36.9 PENILE DISCHARGE: Primary | ICD-10-CM

## 2024-11-25 LAB
C TRACH DNA SPEC QL NAA+PROBE: NOT DETECTED
N GONORRHOEA DNA SPEC QL NAA+PROBE: NOT DETECTED

## 2024-11-25 PROCEDURE — 87086 URINE CULTURE/COLONY COUNT: CPT | Performed by: NURSE PRACTITIONER

## 2024-11-25 PROCEDURE — 87563 M. GENITALIUM AMP PROBE: CPT | Performed by: NURSE PRACTITIONER

## 2024-11-25 PROCEDURE — 87491 CHLMYD TRACH DNA AMP PROBE: CPT | Performed by: NURSE PRACTITIONER

## 2024-11-25 PROCEDURE — 87661 TRICHOMONAS VAGINALIS AMPLIF: CPT | Performed by: NURSE PRACTITIONER

## 2024-11-25 PROCEDURE — 87798 DETECT AGENT NOS DNA AMP: CPT | Mod: 59 | Performed by: NURSE PRACTITIONER

## 2024-11-25 PROCEDURE — 99213 OFFICE O/P EST LOW 20 MIN: CPT | Mod: S$GLB,,, | Performed by: NURSE PRACTITIONER

## 2024-11-25 NOTE — PROGRESS NOTES
"Subjective:      Patient ID: Chuy Thomson is a 19 y.o. male.    Vitals:  height is 5' 9" (1.753 m) and weight is 60.2 kg (132 lb 13.2 oz). His oral temperature is 97 °F (36.1 °C). His blood pressure is 113/68 and his pulse is 67.     Chief Complaint: Penile Discharge (X 1 month.  2 weeks ago STI testing was NEG but has had UPI again. )    This is a 19 y.o. male who presents today with a chief complaint of penile discharge that started 1 month ago. Pt states he recently did std testing, results was negative.    Penile Discharge  The patient's primary symptoms include penile discharge. The current episode started 1 to 4 weeks ago. The problem occurs constantly. The problem has been gradually worsening. The pain is mild. Associated symptoms include dysuria. The penile discharge was White. The symptoms are aggravated by urination. He is sexually active. He inconsistently uses condoms. No, his partner does not have an STD.     Genitourinary:  Positive for dysuria and penile discharge.    He states his d/c is cloudy and white and he has painful urination for past month.   Objective:     Physical Exam   Constitutional: He is oriented to person, place, and time.  Non-toxic appearance. He does not appear ill. No distress.   HENT:   Head: Normocephalic and atraumatic.   Eyes: Conjunctivae are normal. Pupils are equal, round, and reactive to light. Extraocular movement intact   Cardiovascular: Normal rate, regular rhythm, normal heart sounds and normal pulses.   Pulmonary/Chest: Effort normal and breath sounds normal. No respiratory distress. He has no wheezes.   Abdominal: Normal appearance.   Genitourinary:         Comments: Refused exam.     Musculoskeletal: Normal range of motion.         General: Normal range of motion.   Neurological: no focal deficit. He is alert and oriented to person, place, and time.   Skin: Skin is warm, dry and not diaphoretic.   Psychiatric: His behavior is normal. Mood normal.   Nursing note and " vitals reviewed.  I tested patient 2 weeks ago for GC/CT and was NEG.    Assessment:     1. Penile discharge    2. Dysuria        Plan:       Penile discharge  -     CULTURE, URINE  -     C. trachomatis/N. gonorrhoeae by AMP [facility]; Urine  -     Trichomonas vaginalis, RNA, Qual, Urine  -     Mycoplasma genitalium Molecular Detection, PCR Urine  -     Urea nitrogen, urine    Dysuria  -     CULTURE, URINE  -     C. trachomatis/N. gonorrhoeae by AMP [facility]; Urine  -     Trichomonas vaginalis, RNA, Qual, Urine  -     Mycoplasma genitalium Molecular Detection, PCR Urine  -     Urea nitrogen, urine      Discussed at length with this patient about his frequent visits for STI testing and him having UPI.  I explained to him what happens to a male such as strictures with frequent STI's.  If this test comes back positive all of his partners needs to be treated and he needs to start wearing condoms. If this test comes back NEG will refer him to Urology.  Pt verbalized understanding.

## 2024-11-25 NOTE — PATIENT INSTRUCTIONS
STD Screening     IF YOU ARE TESTING TODAY WITH A CONCERN FOR A POTENTIAL EXPOSURE TO AN STD AFTER UNPROTECTED SEXUAL INTERCOURSE, IT IS RECOMMENDED TO TEST TODAY AND AGAIN IN 4-6 WEEKS AFTER THE POTENTIAL EXPOSURE TO ENSURE TRUE NEGATIVE RESULTS.       REMAIN ABSTINENT UNTIL AFTER YOUR TESTS HAVE RESULTED    We will call you in 3-7 days with the results of the testing. If you need more medication when the labs result come in, we will call you and phone them in for you.  PLEASE SET UP YOUR [employer] ACCOUNT SO THAT YOU CAN RECEIVE YOUR LAB RESULTS ONCE THEY RETURN.        IF POSITIVE:     IF YOU ARE POSITIVE FOR AN STD, IT IS RECOMMENDED THAT YOU TEST AGAIN IN 3-6 MONTHS AFTER COMPLETION OF THERAPY TO ENSURE YOU REMAIN NEGATIVE.  CERTAIN POSITIVE TESTS MAY REQUIRE SOONER RE-TESTING AND WE WILL GUIDE YOU IN THESE SITUATIONS.  Notify sexual partners of the need for testing.    NO sexual intercourse until given all lab results and appropriate treatment. Avoid sexual intercourse for 7 days until you and your partner have been treated.  Complete ALL medications prescribed (if required).  Please supplement with OTC probiotics and yogurt if prescribed antibiotics. Take probiotics or eat yogurt 4 hours after you take antibiotics.     Today's testing will give no crediable information if you have unprotected sexual activities going forward.         REMEMBER WEAR CONDOMS AND GET TESTED OFTEN.

## 2024-11-26 LAB — BACTERIA UR CULT: NO GROWTH

## 2024-11-27 LAB
M GENITALIUM DNA UR QL NAA+PROBE: POSITIVE
SPECIMEN SOURCE: ABNORMAL
SPECIMEN SOURCE: NORMAL
T VAGINALIS RRNA SPEC QL NAA+PROBE: NEGATIVE

## 2024-11-29 LAB
SPECIMEN SOURCE: ABNORMAL
U PARVUM DNA SPEC QL NAA+PROBE: NEGATIVE
U UREALYTICUM DNA SPEC QL NAA+PROBE: POSITIVE

## 2024-12-02 ENCOUNTER — PATIENT MESSAGE (OUTPATIENT)
Dept: URGENT CARE | Facility: CLINIC | Age: 19
End: 2024-12-02
Payer: COMMERCIAL

## 2024-12-02 DIAGNOSIS — A49.3 NONGONOCOCCAL URETHRITIS DUE TO UREAPLASMA UREALYTICUM: Primary | ICD-10-CM

## 2024-12-02 DIAGNOSIS — N34.1 NONGONOCOCCAL URETHRITIS DUE TO UREAPLASMA UREALYTICUM: Primary | ICD-10-CM

## 2024-12-02 RX ORDER — DOXYCYCLINE 100 MG/1
100 CAPSULE ORAL EVERY 12 HOURS
Qty: 14 CAPSULE | Refills: 0 | Status: SHIPPED | OUTPATIENT
Start: 2024-12-02 | End: 2024-12-09

## 2025-01-27 ENCOUNTER — OFFICE VISIT (OUTPATIENT)
Dept: URGENT CARE | Facility: CLINIC | Age: 20
End: 2025-01-27
Payer: COMMERCIAL

## 2025-01-27 VITALS
BODY MASS INDEX: 20.49 KG/M2 | TEMPERATURE: 98 F | HEIGHT: 69 IN | DIASTOLIC BLOOD PRESSURE: 65 MMHG | SYSTOLIC BLOOD PRESSURE: 99 MMHG | WEIGHT: 138.31 LBS | OXYGEN SATURATION: 98 % | HEART RATE: 63 BPM | RESPIRATION RATE: 18 BRPM

## 2025-01-27 DIAGNOSIS — R36.9 PENILE DISCHARGE: Primary | ICD-10-CM

## 2025-01-27 DIAGNOSIS — R30.0 DYSURIA: ICD-10-CM

## 2025-01-27 DIAGNOSIS — Z11.3 SCREENING FOR STD (SEXUALLY TRANSMITTED DISEASE): ICD-10-CM

## 2025-01-27 PROCEDURE — 99213 OFFICE O/P EST LOW 20 MIN: CPT | Mod: 25,S$GLB,, | Performed by: NURSE PRACTITIONER

## 2025-01-27 PROCEDURE — 86803 HEPATITIS C AB TEST: CPT | Performed by: NURSE PRACTITIONER

## 2025-01-27 PROCEDURE — 96372 THER/PROPH/DIAG INJ SC/IM: CPT | Mod: S$GLB,,, | Performed by: NURSE PRACTITIONER

## 2025-01-27 PROCEDURE — 87563 M. GENITALIUM AMP PROBE: CPT | Performed by: NURSE PRACTITIONER

## 2025-01-27 PROCEDURE — 87389 HIV-1 AG W/HIV-1&-2 AB AG IA: CPT | Performed by: NURSE PRACTITIONER

## 2025-01-27 PROCEDURE — 87591 N.GONORRHOEAE DNA AMP PROB: CPT | Performed by: NURSE PRACTITIONER

## 2025-01-27 PROCEDURE — 87798 DETECT AGENT NOS DNA AMP: CPT | Mod: 59 | Performed by: NURSE PRACTITIONER

## 2025-01-27 PROCEDURE — 86593 SYPHILIS TEST NON-TREP QUANT: CPT | Performed by: NURSE PRACTITIONER

## 2025-01-27 RX ORDER — CEFTRIAXONE 1 G/1
500 INJECTION, POWDER, FOR SOLUTION INTRAMUSCULAR; INTRAVENOUS ONCE
Status: COMPLETED | OUTPATIENT
Start: 2025-01-27 | End: 2025-01-27

## 2025-01-27 RX ORDER — LIDOCAINE HYDROCHLORIDE 10 MG/ML
1 INJECTION, SOLUTION INFILTRATION; PERINEURAL ONCE
Status: COMPLETED | OUTPATIENT
Start: 2025-01-27 | End: 2025-01-27

## 2025-01-27 RX ADMIN — CEFTRIAXONE 500 MG: 1 INJECTION, POWDER, FOR SOLUTION INTRAMUSCULAR; INTRAVENOUS at 10:01

## 2025-01-27 RX ADMIN — LIDOCAINE HYDROCHLORIDE 1 ML: 10 INJECTION, SOLUTION INFILTRATION; PERINEURAL at 10:01

## 2025-01-27 NOTE — PROGRESS NOTES
"Subjective:      Patient ID: Chuy Thomson is a 19 y.o. male.    Vitals:  height is 5' 9" (1.753 m) and weight is 62.8 kg (138 lb 5.4 oz). His oral temperature is 98.1 °F (36.7 °C). His blood pressure is 99/65 and his pulse is 63. His respiration is 18 and oxygen saturation is 98%.     Chief Complaint: Penile Discharge    This is a 19 y.o. male who presents today with a chief complaint of penile discharge that started 2 weeks ago. Pt states he was treated 1 month ago and symptoms came back.    Penile Discharge  The patient's primary symptoms include penile discharge. This is a new problem. The current episode started 1 to 4 weeks ago. The problem occurs constantly. The problem has been gradually worsening. The penile discharge was Clear. He is sexually active. He inconsistently uses condoms.       Genitourinary:  Positive for penile discharge.      Pt states the discharge is clear and is having burning with urination.  He is asking for Rocephin injection.  Pt had Mycoplasma before [facility] and states he took all the medication.   Objective:     Physical Exam   Constitutional: He is oriented to person, place, and time.  Non-toxic appearance. He does not appear ill. No distress.   HENT:   Head: Normocephalic and atraumatic.   Nose: No rhinorrhea or congestion.   Eyes: Conjunctivae are normal. Pupils are equal, round, and reactive to light. Extraocular movement intact   Cardiovascular: Normal rate, regular rhythm, normal heart sounds and normal pulses.   Pulmonary/Chest: Effort normal and breath sounds normal. No respiratory distress.   Abdominal: Normal appearance.   Musculoskeletal: Normal range of motion.         General: Normal range of motion.   Neurological: no focal deficit. He is alert and oriented to person, place, and time.   Skin: Skin is warm and not diaphoretic.   Psychiatric: His behavior is normal. Mood normal.   Nursing note and vitals reviewed.    Assessment:     1. Penile discharge    2. Dysuria "    3. Screening for STD (sexually transmitted disease)        Plan:       Penile discharge  -     C. trachomatis/N. gonorrhoeae by AMP DNA [facility]; Urine  -     Ureaplasma PCR Urine  -     Mycoplasma genitalium Molecular Detection, PCR Urine    Dysuria  -     C. trachomatis/N. gonorrhoeae by AMP DNA [facility]; Urine  -     Ureaplasma PCR Urine  -     Mycoplasma genitalium Molecular Detection, PCR Urine    Screening for STD (sexually transmitted disease)  -     C. trachomatis/N. gonorrhoeae by AMP [facility]; Urine  -     Hepatitis C Antibody  -     HIV 1/2 Ag/Ab (4th Gen)  -     Treponema Pallidium Antibodies IgG, IgM    Other orders  -     cefTRIAXone injection 500 mg  -     LIDOcaine HCL 10 mg/ml (1%) injection 1 mL

## 2025-01-27 NOTE — PATIENT INSTRUCTIONS
STD Screening     IF YOU ARE TESTING TODAY WITH A CONCERN FOR A POTENTIAL EXPOSURE TO AN STD AFTER UNPROTECTED SEXUAL INTERCOURSE, IT IS RECOMMENDED TO TEST TODAY AND AGAIN IN 4-6 WEEKS AFTER THE POTENTIAL EXPOSURE TO ENSURE TRUE NEGATIVE RESULTS.       REMAIN ABSTINENT UNTIL AFTER YOUR TESTS HAVE RESULTED    We will call you in 3-7 days with the results of the testing. If you need more medication when the labs result come in, we will call you and phone them in for you.  PLEASE SET UP YOUR [employer] ACCOUNT SO THAT YOU CAN RECEIVE YOUR LAB RESULTS ONCE THEY RETURN.        IF POSITIVE:     IF YOU ARE POSITIVE FOR AN STD, IT IS RECOMMENDED THAT YOU TEST AGAIN IN 3-6 MONTHS AFTER COMPLETION OF THERAPY TO ENSURE YOU REMAIN NEGATIVE.  CERTAIN POSITIVE TESTS MAY REQUIRE SOONER RE-TESTING AND WE WILL GUIDE YOU IN THESE SITUATIONS.  Notify sexual partners of the need for testing.    NO sexual intercourse until given all lab results and appropriate treatment. Avoid sexual intercourse for 7 days until you and your partner have been treated.  Complete ALL medications prescribed (if required).  Please supplement with OTC probiotics and yogurt if prescribed antibiotics. Take probiotics or eat yogurt 4 hours after you take antibiotics.     Today's testing will give no crediable information if you have unprotected sexual activities going forward.         REMEMBER WEAR CONDOMS AND GET TESTED OFTEN.

## 2025-01-29 ENCOUNTER — PATIENT MESSAGE (OUTPATIENT)
Dept: URGENT CARE | Facility: CLINIC | Age: 20
End: 2025-01-29
Payer: COMMERCIAL

## 2025-01-29 ENCOUNTER — TELEPHONE (OUTPATIENT)
Dept: URGENT CARE | Facility: CLINIC | Age: 20
End: 2025-01-29
Payer: COMMERCIAL

## 2025-01-29 DIAGNOSIS — A49.3 MYCOPLASMA INFECTION: Primary | ICD-10-CM

## 2025-01-29 LAB
C TRACH DNA SPEC QL NAA+PROBE: NOT DETECTED
M GENITALIUM DNA UR QL NAA+PROBE: POSITIVE
N GONORRHOEA DNA SPEC QL NAA+PROBE: NOT DETECTED
SPECIMEN SOURCE: ABNORMAL
SPECIMEN SOURCE: ABNORMAL
U PARVUM DNA SPEC QL NAA+PROBE: POSITIVE
U UREALYTICUM DNA SPEC QL NAA+PROBE: NEGATIVE

## 2025-01-29 RX ORDER — AZITHROMYCIN 250 MG/1
TABLET, FILM COATED ORAL
Qty: 6 TABLET | Refills: 0 | Status: SHIPPED | OUTPATIENT
Start: 2025-01-29 | End: 2025-02-03

## 2025-01-29 NOTE — TELEPHONE ENCOUNTER
Called in z pack for patient with mycoplasma g. Infection. Instructed to return in 4-6 weeks for re-check.

## 2025-01-29 NOTE — TELEPHONE ENCOUNTER
Called patient, no answer, left VM to call clinic back. Positive for mycoplasma had previous infection in December, unclear if re-infection, treatment failure. I would recommend A Z pack and recheck in 2-4 weeks.

## 2025-03-06 ENCOUNTER — OFFICE VISIT (OUTPATIENT)
Dept: URGENT CARE | Facility: CLINIC | Age: 20
End: 2025-03-06
Payer: COMMERCIAL

## 2025-03-06 VITALS
RESPIRATION RATE: 18 BRPM | TEMPERATURE: 98 F | DIASTOLIC BLOOD PRESSURE: 67 MMHG | BODY MASS INDEX: 20.44 KG/M2 | OXYGEN SATURATION: 99 % | HEIGHT: 69 IN | HEART RATE: 70 BPM | WEIGHT: 138 LBS | SYSTOLIC BLOOD PRESSURE: 106 MMHG

## 2025-03-06 DIAGNOSIS — R30.0 DYSURIA: ICD-10-CM

## 2025-03-06 DIAGNOSIS — R36.9 PENILE DISCHARGE: Primary | ICD-10-CM

## 2025-03-06 PROCEDURE — 87563 M. GENITALIUM AMP PROBE: CPT | Performed by: NURSE PRACTITIONER

## 2025-03-06 PROCEDURE — 87798 DETECT AGENT NOS DNA AMP: CPT | Performed by: NURSE PRACTITIONER

## 2025-03-06 PROCEDURE — 87591 N.GONORRHOEAE DNA AMP PROB: CPT | Performed by: NURSE PRACTITIONER

## 2025-03-06 PROCEDURE — 99213 OFFICE O/P EST LOW 20 MIN: CPT | Mod: S$GLB,,, | Performed by: NURSE PRACTITIONER

## 2025-03-06 RX ORDER — CYPROHEPTADINE HYDROCHLORIDE 4 MG/1
4 TABLET ORAL NIGHTLY
COMMUNITY
Start: 2025-02-21

## 2025-03-06 NOTE — PROGRESS NOTES
"Subjective:      Patient ID: Chuy Thomson is a 19 y.o. male.    Vitals:  height is 5' 9" (1.753 m) and weight is 62.6 kg (138 lb). His oral temperature is 97.8 °F (36.6 °C). His blood pressure is 106/67 and his pulse is 70. His respiration is 18 and oxygen saturation is 99%.     Chief Complaint: Penile Discharge    This is a 19 y.o. male who presents today with a chief complaint of penile discharge that started about 5 days ago. Pt states he would like to retest.    Penile Discharge  The patient's primary symptoms include penile discharge. This is a new problem. The current episode started in the past 7 days. The problem occurs constantly.       Genitourinary:  Positive for penile discharge.      Pt states he has the same partner but also another partner as well all unprotected.    Objective:     Physical Exam   Constitutional: He is oriented to person, place, and time.  Non-toxic appearance. He does not appear ill. No distress.   HENT:   Head: Normocephalic and atraumatic.   Nose: No rhinorrhea or congestion.   Eyes: Conjunctivae are normal. Pupils are equal, round, and reactive to light. Extraocular movement intact   Cardiovascular: Normal rate, regular rhythm, normal heart sounds and normal pulses.   Pulmonary/Chest: Effort normal and breath sounds normal. No respiratory distress. He has no wheezes.   Abdominal: Normal appearance.   Musculoskeletal: Normal range of motion.         General: Normal range of motion.   Neurological: no focal deficit. He is alert and oriented to person, place, and time.   Skin: Skin is warm and not diaphoretic.   Psychiatric: His behavior is normal. Mood normal.   Nursing note and vitals reviewed.    Assessment:     1. Penile discharge    2. Dysuria        Plan:       Penile discharge  -     C. trachomatis/N. gonorrhoeae by AMP DNA [facility]; Urine  -     Mycoplasma genitalium Molecular Detection, PCR Urine  -     Ureaplasma PCR Urine    Dysuria  -     C. trachomatis/N. gonorrhoeae by " AMP DNA [facility]; Urine  -     Mycoplasma genitalium Molecular Detection, PCR Urine  -     Ureaplasma PCR Urine    Pt wants to wait for his results for treatment.  Has a significant hx of STI's and Mycoplasma and Ureaplasma.  I have discussed with him numerous times my concerns for his sexual health.

## 2025-03-06 NOTE — PATIENT INSTRUCTIONS
STD Screening     IF YOU ARE TESTING TODAY WITH A CONCERN FOR A POTENTIAL EXPOSURE TO AN STD AFTER UNPROTECTED SEXUAL INTERCOURSE, IT IS RECOMMENDED TO TEST TODAY AND AGAIN IN 4-6 WEEKS AFTER THE POTENTIAL EXPOSURE TO ENSURE TRUE NEGATIVE RESULTS.       REMAIN ABSTINENT UNTIL AFTER YOUR TESTS HAVE RESULTED    We will call you in 3-7 days with the results of the testing. If you need more medication when the labs result come in, we will call you and phone them in for you.  PLEASE SET UP YOUR [employer] ACCOUNT SO THAT YOU CAN RECEIVE YOUR LAB RESULTS ONCE THEY RETURN.        IF POSITIVE:     IF YOU ARE POSITIVE FOR AN STD, IT IS RECOMMENDED THAT YOU TEST AGAIN IN 3-6 MONTHS AFTER COMPLETION OF THERAPY TO ENSURE YOU REMAIN NEGATIVE.  CERTAIN POSITIVE TESTS MAY REQUIRE SOONER RE-TESTING AND WE WILL GUIDE YOU IN THESE SITUATIONS.  Notify sexual partners of the need for testing.    NO sexual intercourse until given all lab results and appropriate treatment. Avoid sexual intercourse for 7 days until you and your partner have been treated.  Complete ALL medications prescribed (if required).  Please supplement with OTC probiotics and yogurt if prescribed antibiotics. Take probiotics or eat yogurt 4 hours after you take antibiotics.     Today's testing will give no crediable information if you have unprotected sexual activities going forward.         REMEMBER WEAR CONDOMS AND GET TESTED OFTEN.

## 2025-03-06 NOTE — PROGRESS NOTES
"Subjective:      Patient ID: Chuy Thomson is a 19 y.o. male.    Vitals:  height is 5' 9" (1.753 m) and weight is 62.6 kg (138 lb). His oral temperature is 97.8 °F (36.6 °C). His blood pressure is 106/67 and his pulse is 70. His respiration is 18 and oxygen saturation is 99%.     Chief Complaint: Exposure to STD    HPI  ROS   Objective:     Physical Exam    Assessment:     No diagnosis found.    Plan:       There are no diagnoses linked to this encounter.                "

## 2025-03-07 LAB
C TRACH DNA SPEC QL NAA+PROBE: NOT DETECTED
N GONORRHOEA DNA SPEC QL NAA+PROBE: NOT DETECTED

## 2025-03-08 LAB
M GENITALIUM DNA UR QL NAA+PROBE: POSITIVE
SPECIMEN SOURCE: ABNORMAL

## 2025-03-10 ENCOUNTER — PATIENT MESSAGE (OUTPATIENT)
Dept: URGENT CARE | Facility: CLINIC | Age: 20
End: 2025-03-10
Payer: COMMERCIAL

## 2025-03-10 DIAGNOSIS — A49.3 MYCOPLASMA INFECTION: Primary | ICD-10-CM

## 2025-03-10 RX ORDER — DOXYCYCLINE 100 MG/1
100 CAPSULE ORAL EVERY 12 HOURS
Qty: 14 CAPSULE | Refills: 0 | Status: SHIPPED | OUTPATIENT
Start: 2025-03-10 | End: 2025-03-17

## 2025-04-14 ENCOUNTER — OFFICE VISIT (OUTPATIENT)
Dept: URGENT CARE | Facility: CLINIC | Age: 20
End: 2025-04-14
Payer: COMMERCIAL

## 2025-04-14 VITALS
HEART RATE: 66 BPM | OXYGEN SATURATION: 98 % | WEIGHT: 136.69 LBS | RESPIRATION RATE: 19 BRPM | BODY MASS INDEX: 20.24 KG/M2 | SYSTOLIC BLOOD PRESSURE: 102 MMHG | DIASTOLIC BLOOD PRESSURE: 66 MMHG | TEMPERATURE: 97 F | HEIGHT: 69 IN

## 2025-04-14 DIAGNOSIS — R36.9 PENILE DISCHARGE: Primary | ICD-10-CM

## 2025-04-14 PROCEDURE — 87491 CHLMYD TRACH DNA AMP PROBE: CPT | Performed by: NURSE PRACTITIONER

## 2025-04-14 PROCEDURE — 99213 OFFICE O/P EST LOW 20 MIN: CPT | Mod: S$GLB,,, | Performed by: NURSE PRACTITIONER

## 2025-04-14 RX ORDER — DOXYCYCLINE 100 MG/1
100 CAPSULE ORAL EVERY 12 HOURS
Qty: 14 CAPSULE | Refills: 0 | Status: SHIPPED | OUTPATIENT
Start: 2025-04-14 | End: 2025-04-21

## 2025-04-14 NOTE — PROGRESS NOTES
"Subjective:      Patient ID: Chuy Thomson is a 19 y.o. male.    Vitals:  height is 5' 9" (1.753 m) and weight is 62 kg (136 lb 11 oz). His oral temperature is 97.4 °F (36.3 °C). His blood pressure is 102/66 and his pulse is 66. His respiration is 19 and oxygen saturation is 98%.     Chief Complaint: Penile Discharge    This is a 19 y.o. male who presents today with a chief complaint of white cloudy discharge that started today.     Penile Discharge  The patient's primary symptoms include penile discharge. This is a new problem. The current episode started today. The penile discharge was White. He has tried nothing for the symptoms.       Genitourinary:  Positive for penile discharge.      Pt has 2 sexual partners.  He states he has told both of them in the past that he has been positive for Mycoplasma.  He is not certain if they have been treated or not.  He states he had UPI with one of them last week and woke up this morning with white penile discharge.  No c/o burning with urination.   Objective:     Physical Exam   Constitutional: He is oriented to person, place, and time.  Non-toxic appearance. He does not appear ill. No distress.   HENT:   Head: Normocephalic and atraumatic.   Nose: No rhinorrhea or congestion.   Eyes: Conjunctivae are normal. Pupils are equal, round, and reactive to light. Extraocular movement intact   Cardiovascular: Normal rate and regular rhythm.   Pulmonary/Chest: Effort normal. No respiratory distress.   Abdominal: Normal appearance.   Genitourinary:         Comments: Exam deferred     Musculoskeletal: Normal range of motion.         General: Normal range of motion.   Neurological: no focal deficit. He is alert and oriented to person, place, and time.   Skin: Skin is warm, dry and not diaphoretic.   Psychiatric: Mood normal. His affect is angry. He is agitated.      Comments: Pt is very angry with himself for consistently having unprotected intercourse.  We have discussed multiple times " about using protection to prevent this from occurring but to no avail he is here again today.  I encouraged him to again talk to his two partners.  One is a student here at the university.     Nursing note and vitals reviewed.    Assessment:     1. Penile discharge        Plan:       Penile discharge  -     Mycoplasma genitalium Molecular Detection, PCR Urine  -     Ureaplasma PCR  -     C. trachomatis/N. gonorrhoeae by AMP DNA [place]; Urine  -     doxycycline (VIBRAMYCIN) 100 MG Cap; Take 1 capsule (100 mg total) by mouth every 12 (twelve) hours. for 7 days  Dispense: 14 capsule; Refill: 0    We had a long discussion that pt may become resistant to Doxycyline since I am having to give it to him so frequently.  He did go see a urologist at a different medical facility but at the time he did not have any symptoms.  They suggested a camera through the urethra but patient is not interested in going through that at this time.

## 2025-04-16 ENCOUNTER — RESULTS FOLLOW-UP (OUTPATIENT)
Dept: URGENT CARE | Facility: CLINIC | Age: 20
End: 2025-04-16

## 2025-04-16 ENCOUNTER — TELEPHONE (OUTPATIENT)
Dept: URGENT CARE | Facility: CLINIC | Age: 20
End: 2025-04-16
Payer: COMMERCIAL

## 2025-04-16 DIAGNOSIS — A49.3 MYCOPLASMA INFECTION: Primary | ICD-10-CM

## 2025-04-16 LAB
MYCOPLASMA GENITALIUM RESULT: POSITIVE
SPECIMEN SOURCE: ABNORMAL

## 2025-04-16 RX ORDER — MOXIFLOXACIN HYDROCHLORIDE 400 MG/1
400 TABLET ORAL DAILY
Qty: 7 TABLET | Refills: 0 | Status: SHIPPED | OUTPATIENT
Start: 2025-04-16 | End: 2025-04-23

## 2025-04-16 NOTE — TELEPHONE ENCOUNTER
Patient called. Sent in moxifloxacin 400 mg daily for 7 days. Needs to follow up with Urology, stressed this with him, he is established with a urologist.

## 2025-04-16 NOTE — TELEPHONE ENCOUNTER
I called patient regarding his testing results. He has a seemingly resistant strain of mycoplasma genitalium. He has been treated with azithromycin and doxycycline multiple times. He saw Urology in March and they recommended next step with cystoscopy. I would recommend he follow up with them.     To treat the patient, I would treat with Moxifloxacin 400 mg daily for 7 days with instructions for close follow up with urology and fluoroquinolone precautions, (no heavy lifting, aerobic exercise for two weeks after therapy, avoidance of sun exposure)

## 2025-04-17 LAB
C TRACH DNA SPEC QL NAA+PROBE: NOT DETECTED
CTGC SOURCE (OHS) ORD-325: NORMAL
N GONORRHOEA DNA UR QL NAA+PROBE: NOT DETECTED
SPECIMEN SOURCE: NORMAL
U PARVUM DNA SPEC QL NAA+PROBE: NEGATIVE
U UREALYTICUM DNA SPEC QL NAA+PROBE: NEGATIVE

## 2025-04-21 ENCOUNTER — PATIENT MESSAGE (OUTPATIENT)
Dept: URGENT CARE | Facility: CLINIC | Age: 20
End: 2025-04-21
Payer: COMMERCIAL